# Patient Record
Sex: FEMALE | Race: WHITE | NOT HISPANIC OR LATINO | URBAN - METROPOLITAN AREA
[De-identification: names, ages, dates, MRNs, and addresses within clinical notes are randomized per-mention and may not be internally consistent; named-entity substitution may affect disease eponyms.]

---

## 2024-10-25 ENCOUNTER — EMERGENCY (EMERGENCY)
Facility: HOSPITAL | Age: 49
LOS: 1 days | Discharge: HOME | End: 2024-10-25
Attending: EMERGENCY MEDICINE | Admitting: EMERGENCY MEDICINE
Payer: COMMERCIAL

## 2024-10-25 VITALS
DIASTOLIC BLOOD PRESSURE: 84 MMHG | WEIGHT: 190.04 LBS | OXYGEN SATURATION: 97 % | HEART RATE: 79 BPM | HEIGHT: 60 IN | SYSTOLIC BLOOD PRESSURE: 137 MMHG | TEMPERATURE: 98 F | RESPIRATION RATE: 19 BRPM

## 2024-10-25 PROCEDURE — 99284 EMERGENCY DEPT VISIT MOD MDM: CPT

## 2024-10-25 PROCEDURE — 93010 ELECTROCARDIOGRAM REPORT: CPT

## 2024-10-25 RX ORDER — SODIUM CHLORIDE 0.9 % (FLUSH) 0.9 %
1000 SYRINGE (ML) INJECTION ONCE
Refills: 0 | Status: DISCONTINUED | OUTPATIENT
Start: 2024-10-25 | End: 2024-10-29

## 2024-10-25 RX ORDER — ONDANSETRON HCL/PF 4 MG/2 ML
4 VIAL (ML) INJECTION ONCE
Refills: 0 | Status: DISCONTINUED | OUTPATIENT
Start: 2024-10-25 | End: 2024-10-29

## 2024-10-25 NOTE — ED ADULT TRIAGE NOTE - CHIEF COMPLAINT QUOTE
Pt arrives to ED c/o N/V x3 emesis following taking an edible. possibly 25mg,  Pt denies prior medical problems.   Family at bedside.  Pt is panicked in triage but calms with verbal support.

## 2024-10-25 NOTE — ED PROVIDER NOTE - NSFOLLOWUPINSTRUCTIONS_ED_ALL_ED_FT
You should avoid taking edibles in the future they seem to make you very anxious.  It does make sense to probably follow-up with a therapist.  You have been given referrals to the crisis center.    Also you should follow-up with a primary care doc.  If you want a cardiologist you can also go through [facility] find a doctor find one that is in alignment with your insurance.  I am referring you to the discharge lounge they can call you about a primary care doctor    Your EKG here was very normal and does not show any acute findings    I performed a history and physical exam of the patient and discussed their management with the resident and /or advanced care provider. I personally made/approved the management plan and take responsibility for the patient management. I reviewed the resident and /or ACP's note and agree with the documented findings and plan of care. My medical decison making and observations are found above.

## 2024-10-25 NOTE — ED PROVIDER NOTE - PATIENT PORTAL LINK FT
You can access the FollowMyHealth Patient Portal offered by [facility] by registering at the following website: [email or web address]. By joining [employer]’s FollowMyHealth portal, you will also be able to view your health information using other applications (apps) compatible with our system.

## 2024-10-25 NOTE — ED PROVIDER NOTE - CLINICAL SUMMARY MEDICAL DECISION MAKING FREE TEXT BOX
Kamla Moreno MD attending physician patient is a 49-year-old woman who took some edibles today because she felt like she wanted to relax and have back pain improve which is a chronic problem for her she then became very anxious and panicked.  She had nausea and vomiting x 3.  She does say that she had some episode distantly earlier today where she felt like her heart skipped.  She does not have that sensation at this time.  She denies abdominal pain chest pain shortness of breath at this time.  Denies fever.    Blood pressure 137/84 respiratory rate is 19 heart rate 79 O2 sat 97 patient is afebrile    Pt alert and can phonate well  h at/nc  perrl, conj clear, sclera anicteric,  neck supple  cor rrr pos s1s2  lungs clear to asno wheeze  abd soft no r/g/t  ext no edema no deformities no tremor  neueo awake, moves all extremities with strength  psych initially very very anxious then observed an hour later and has normal affect  vs reasonable    Patient with a EKG that shows normal sinus rhythm rate of 55 no ischemic changes ST elevations or depressions.    Patient with anxious reaction and nausea and vomiting status post taking edibles.  Now improved.  She is advised to avoid edibles in the future.  She has no regular doctor we will connect her through the discharge lounge.  She had some momentary concern of feeling something "funny in her heart" earlier today however here has an EKG that is normal and does not have chest pain at this time    Patient safe to be discharged and can follow-up with primary care doctor outside